# Patient Record
Sex: FEMALE | Race: WHITE | NOT HISPANIC OR LATINO | Employment: UNEMPLOYED | URBAN - METROPOLITAN AREA
[De-identification: names, ages, dates, MRNs, and addresses within clinical notes are randomized per-mention and may not be internally consistent; named-entity substitution may affect disease eponyms.]

---

## 2017-01-03 ENCOUNTER — OFFICE VISIT (OUTPATIENT)
Dept: DERMATOLOGY | Facility: CLINIC | Age: 22
End: 2017-01-03
Payer: COMMERCIAL

## 2017-01-03 DIAGNOSIS — L70.0 ACNE VULGARIS: Primary | ICD-10-CM

## 2017-01-03 PROCEDURE — 1159F MED LIST DOCD IN RCRD: CPT | Mod: S$GLB,,,

## 2017-01-03 PROCEDURE — 99202 OFFICE O/P NEW SF 15 MIN: CPT | Mod: S$GLB,,,

## 2017-01-03 PROCEDURE — 99999 PR PBB SHADOW E&M-NEW PATIENT-LVL II: CPT | Mod: PBBFAC,,,

## 2017-01-03 RX ORDER — DAPSONE 50 MG/G
GEL TOPICAL
Qty: 60 G | Refills: 1 | Status: SHIPPED | OUTPATIENT
Start: 2017-01-03 | End: 2017-01-09

## 2017-01-03 RX ORDER — ADAPALENE 1 MG/G
CREAM TOPICAL
Qty: 1 TUBE | Refills: 1 | Status: SHIPPED | OUTPATIENT
Start: 2017-01-03 | End: 2017-01-09

## 2017-01-03 NOTE — PATIENT INSTRUCTIONS

## 2017-01-03 NOTE — MR AVS SNAPSHOT
[facility]  [address] [place] [address]  Phone: [phone]  Fax: [phone]                  Toma Haas   1/3/2017 11:00 AM   Office Visit    Description:  Female : 1995   Provider:  Mary Kay Washington NP   Department:  [facility]           Reason for Visit     Acne           Diagnoses this Visit        Comments    Acne vulgaris    -  Primary            To Do List           Goals (5 Years of Data)     None       These Medications        Disp Refills Start End    ACZONE 5 % topical gel 60 g 1 1/3/2017     AAA qam    Pharmacy: [facility]/pharmacy #[id number] - [place], [place] - [address]. Ph #: [phone]       DIFFERIN 0.1 % cream 1 Tube 1 1/3/2017     AAA face q    Pharmacy: [facility]/pharmacy #[id number] - [place], [place] - [address] [facility]. Ph #: [phone]       Notes to Pharmacy: $25      [facility] On [facility]     [facility] On Call Nurse Care Line -  Assistance  Registered nurses in the [facility] On Call Center provide clinical advisement, health education, appointment booking, and other advisory services.  Call for this free service at [phone].             Medications           Message regarding Medications     Verify the changes and/or additions to your medication regime listed below are the same as discussed with your clinician today.  If any of these changes or additions are incorrect, please notify your healthcare provider.        START taking these NEW medications        Refills    ACZONE 5 % topical gel 1    Sig: AAA qam    Class: Normal    DIFFERIN 0.1 % cream 1    Sig: AAA face qhs    Class: Normal           Verify that the below list of medications is an accurate representation of the medications you are currently taking.  If none reported, the list may be blank. If incorrect, please contact your healthcare provider. Carry this list with you in case of emergency.           Current Medications     ACZONE 5 % topical gel AAA qam    DIFFERIN  0.1 % cream AAA face qhs           Clinical Reference Information           Allergies as of 1/3/2017     No Known Allergies      Immunizations Administered on Date of Encounter - 1/3/2017     None      [facility] Sign-Up     Activating your [facility] account is as easy as 1-2-3!     1) Visit [email or web address], select Sign Up Now, enter this activation code and your date of birth, then select Next.  [email or web address]  Expires: 2/5/2017 10:06 AM      2) Create a username and password to use when you visit [facility] in the future and select a security question in case you lose your password and select Next.    3) Enter your e-mail address and click Sign Up!    Additional Information  If you have questions, please e-mail [email or web address] or call [phone] to talk to our [facility] staff. Remember, [facility] is NOT to be used for urgent needs. For medical emergencies, dial 911.         Instructions    RETINOIDS           Your doctor has prescribed a topical retinoid for your skin. A retinoid is a vitamin A derived product used to treat a variety of skin conditions including acne, actinic keratoses (pre-skin cancers), uneven pigmentation from sun damage, fine lines and wrinkles, and enlarged pores.    How do they work?         Retinoids increase skin cell turn over from the normal 30 days to five or six days, minimizing clogged pores-the major factor in acne. Retinoids can also repair the DNA in cells damaged by the sun helping to even out skin pigmentation and clear pre-skin cancers. They can shrink oil glands and minimize the appearance of large pores. These effects can not be appreciated unless the medication is used on a consistent basis!    How do I use a retinoid?         After washing with a mild cleanser (Purpose, Aqua glycolic face cleanser, Cetaphil, Neutrogena deep cream cleanser), the skin should be moisturized with a non-retinol containing moisturizer such as Cerave PM. Then a thin layer of medication is  applied to the forehead, nose cheeks, and chin (and around eyes if treating fine lines and wrinkles) at night. The amount of medication needed to cover the entire face should be no more than the size of a green pea. Irritation around the eyes can be treated with Vaseline at night.    What if my skin appears dry, red, and is peeling?          Retinoids do not cause dry skin but rather they cause the top layer of the skin the shed, giving an appearance of dry skin. In fact, new healthy skin cells are replacing older, damaged cells on the surface. This usually occurs the first 2-4 weeks as the skin is adjusting to the medication. It is reasonable to use the medication every other night or even every 2 nights until your skin adjusts. You can use a MILD exfoliant to remove the peeling skin (Aveeno daily clarifying pads, Aqua glycolic face cream) and can apply a moisturizer throughout the day as needed. Retinoids come in a variety of strengths and vehicles and your doctor can find one best for you. If you cannot tolerate prescription strength retinoids, over the counter products with retinol may be beneficial. (Olay ProX wrinkle cream, BILLIE deep wrinkle cream, Green Cream at [employer])    Will my skin be more sensitive in the sun?           You will need to use sunscreen with SPF 30 daily. Retinoids will cause the outermost layer of the skin to be thinner and thus more sensitive to ultraviolet rays. However, remember that over time, retinoids actually make the skin thicker by enhancing collagen deposition which protects the skin from sun damage.    When will I see results?            If you are using a retinoid for acne, you should see improvement in 6-8 weeks. Do not be alarmed if you find that your acne gets worse before it gets better-KEEP USING THE MEDICATION- this is a normal response and your acne will improve if you can stick with it.           If you are using the medication for anti-aging and skin  dyspigmentation, you may see results in 3 months, but most effects are not visible until 6 months. Retinoids are clinically proven to reverse signs of aging, but only if used on a CONSTISTENT BASIS!             Remember that retinoids should not be used if you are pregnant.           Discontinue use 1 week prior to waxing, as skin is more likely to tear.

## 2017-01-03 NOTE — PROGRESS NOTES
Subjective:       Patient ID:  Toma Haas is a 21 y.o. female who presents for   Chief Complaint   Patient presents with    Acne     Acne  - Initial  Affected locations: face and back  Duration: years.  Signs / symptoms: asymptomatic  Aggravated by: nothing    History of accutane course 5 years ago. She is not currently treating with over the counter or prescription acne medication.     Review of Systems   Constitutional: Negative for fever and chills.   HENT: Negative for nosebleeds and headaches.    Gastrointestinal: Negative for nausea, vomiting, diarrhea, indigestion and Sensitivity to oral antibiotics.   Genitourinary: Negative for irregular periods.        Has IUD   Musculoskeletal: Negative for arthralgias.   Skin: Positive for activity-related sunscreen use. Negative for daily sunscreen use and recent sunburn.   Neurological: Negative for headaches.   Psychiatric/Behavioral: Negative for depressed mood (history of depression).        Objective:    Physical Exam   Constitutional: She appears well-developed and well-nourished. No distress.   HENT:   Mouth/Throat: Lips normal.    Eyes: Lids are normal.    Neurological: She is alert and oriented to person, place, and time. She is not disoriented.   Psychiatric: She has a normal mood and affect.   Skin:   Areas Examined (abnormalities noted in diagram):   Head / Face Inspection Performed  Neck Inspection Performed  Chest / Axilla Inspection Performed  Back Inspection Performed  RUE Inspected  LUE Inspection Performed                   Diagram Legend     Erythematous scaling macule/papule c/w actinic keratosis       Vascular papule c/w angioma      Pigmented verrucoid papule/plaque c/w seborrheic keratosis      Yellow umbilicated papule c/w sebaceous hyperplasia      Irregularly shaped tan macule c/w lentigo     1-2 mm smooth white papules consistent with Milia      Movable subcutaneous cyst with punctum c/w epidermal inclusion cyst      Subcutaneous  movable cyst c/w pilar cyst      Firm pink to brown papule c/w dermatofibroma      Pedunculated fleshy papule(s) c/w skin tag(s)      Evenly pigmented macule c/w junctional nevus     Mildly variegated pigmented, slightly irregular-bordered macule c/w mildly atypical nevus      Flesh colored to evenly pigmented papule c/w intradermal nevus       Pink pearly papule/plaque c/w basal cell carcinoma      Erythematous hyperkeratotic cursted plaque c/w SCC      Surgical scar with no sign of skin cancer recurrence      Open and closed comedones      Inflammatory papules and pustules      Verrucoid papule consistent consistent with wart     Erythematous eczematous patches and plaques     Dystrophic onycholytic nail with subungual debris c/w onychomycosis     Umbilicated papule    Erythematous-base heme-crusted tan verrucoid plaque consistent with inflamed seborrheic keratosis     Erythematous Silvery Scaling Plaque c/w Psoriasis     See annotation      Assessment / Plan:        Acne vulgaris  -     ACZONE 5 % topical gel; AAA qam  Dispense: 60 g; Refill: 1  -     DIFFERIN 0.1 % cream; AAA face qhs  Dispense: 1 Tube; Refill: 1  Discussed using pea-sized drop to entire face qhs.  Start applying every 2-3 nights then gradually increase to nightly application as tolerated.  May notice mild redness and irritation  Retinoid brochure provided and discussed  Discussed importance of sun protection with at least SPF 30 daily          Return in about 8 weeks (around 2/28/2017).

## 2017-01-05 ENCOUNTER — PATIENT MESSAGE (OUTPATIENT)
Dept: DERMATOLOGY | Facility: CLINIC | Age: 22
End: 2017-01-05

## 2017-01-09 DIAGNOSIS — L70.0 ACNE VULGARIS: Primary | ICD-10-CM

## 2017-01-09 RX ORDER — ADAPALENE 0.1 G/100G
CREAM TOPICAL
Qty: 45 G | Refills: 1 | Status: SHIPPED | OUTPATIENT
Start: 2017-01-09 | End: 2017-02-20 | Stop reason: SDUPTHER

## 2017-01-09 RX ORDER — DAPSONE 50 MG/G
GEL TOPICAL
Qty: 60 G | Refills: 1 | Status: SHIPPED | OUTPATIENT
Start: 2017-01-09 | End: 2017-02-20 | Stop reason: SDUPTHER

## 2017-02-19 ENCOUNTER — PATIENT MESSAGE (OUTPATIENT)
Dept: DERMATOLOGY | Facility: CLINIC | Age: 22
End: 2017-02-19

## 2017-02-20 DIAGNOSIS — L70.0 ACNE VULGARIS: ICD-10-CM

## 2017-02-20 RX ORDER — DAPSONE 50 MG/G
GEL TOPICAL
Qty: 60 G | Refills: 1 | Status: SHIPPED | OUTPATIENT
Start: 2017-02-20

## 2017-02-20 RX ORDER — ADAPALENE 0.1 G/100G
CREAM TOPICAL
Qty: 45 G | Refills: 1 | Status: SHIPPED | OUTPATIENT
Start: 2017-02-20